# Patient Record
Sex: FEMALE | Race: BLACK OR AFRICAN AMERICAN | URBAN - METROPOLITAN AREA
[De-identification: names, ages, dates, MRNs, and addresses within clinical notes are randomized per-mention and may not be internally consistent; named-entity substitution may affect disease eponyms.]

---

## 2018-01-29 ENCOUNTER — OFFICE VISIT (OUTPATIENT)
Dept: INTERNAL MEDICINE CLINIC | Age: 63
End: 2018-01-29

## 2018-01-29 VITALS
HEART RATE: 74 BPM | WEIGHT: 157 LBS | TEMPERATURE: 98.3 F | OXYGEN SATURATION: 98 % | DIASTOLIC BLOOD PRESSURE: 78 MMHG | BODY MASS INDEX: 30.82 KG/M2 | RESPIRATION RATE: 18 BRPM | HEIGHT: 60 IN | SYSTOLIC BLOOD PRESSURE: 128 MMHG

## 2018-01-29 DIAGNOSIS — E11.8 CONTROLLED TYPE 2 DIABETES MELLITUS WITH COMPLICATION, WITHOUT LONG-TERM CURRENT USE OF INSULIN (HCC): Primary | ICD-10-CM

## 2018-01-29 DIAGNOSIS — R20.2 NUMBNESS AND TINGLING IN LEFT ARM: ICD-10-CM

## 2018-01-29 DIAGNOSIS — R42 DIZZINESS: ICD-10-CM

## 2018-01-29 DIAGNOSIS — R20.0 NUMBNESS AND TINGLING IN LEFT ARM: ICD-10-CM

## 2018-01-29 NOTE — PROGRESS NOTES
History:   Jovani Doss is a 65 y.o. female presenting today for an initial visit for Establish Care; Diabetes; Dizziness; and Tingling (left arm )  1. DM:  She has been on long acting insulin before every day. Hasn't been on that for 3 years. She was on it for about 1 year. Never on pills. She almost went into diabetic coma and was hospitalized. That's when they put her on the insulin. Then after 1 year she stopped the insulin. She stopped her diet and eating habits. Lost a lot of weight at that time. Then checked her sugars and were fine so stopped taking the insulin. Sugars still fine. So didn't restart the insulin. 2.  Dizziness:  Her equilibrium is off. When she gets up she feels dizzy. No dizziness in bed when moves head. Has polyuria and polydipsia. So thinks maybe sugar high. 3.  Tingling in left arm:  Calls it a vibration sensation. Review of Systems   Constitutional: Negative. Respiratory: Negative. Negative for shortness of breath. Cardiovascular: Negative. Negative for chest pain. Neurological: Positive for dizziness. Endo/Heme/Allergies: Positive for polydipsia. Past Medical History:   Diagnosis Date    Diabetes [facility])        History reviewed. No pertinent surgical history. Social History     Social History    Marital status:      Spouse name: N/A    Number of children: N/A    Years of education: N/A     Occupational History    Not on file.      Social History Main Topics    Smoking status: Current Every Day Smoker     Packs/day: 0.25     Years: 2.00    Smokeless tobacco: Never Used      Comment: maybe 1 to 2 cig a day     Alcohol use No    Drug use: No    Sexual activity: Yes     Partners: Male     Other Topics Concern    Not on file     Social History Narrative    No narrative on file       Family History   Problem Relation Age of Onset    Stroke Mother     No Known Problems Father        No current outpatient prescriptions on file prior to visit. No current facility-administered medications on file prior to visit. No Known Allergies    Objective:   VS:    Visit Vitals    /78 (BP 1 Location: Left arm, BP Patient Position: Sitting)    Pulse 74    Temp 98.3 °F (36.8 °C) (Oral)    Resp 18    Ht 5' (1.524 m)    Wt 157 lb (71.2 kg)    SpO2 98%    BMI 30.66 kg/m2     Physical Exam   Constitutional: She is oriented to person, place, and time. She appears well-developed and well-nourished. No distress. HENT:   Head: Normocephalic and atraumatic. Right Ear: External ear normal.   Left Ear: External ear normal.   Nose: Nose normal.   Mouth/Throat: Oropharynx is clear and moist.   Eyes: EOM are normal. Pupils are equal, round, and reactive to light. Neck: Normal range of motion. Neck supple. Carotid bruit is not present. No tracheal deviation present. Cardiovascular: Normal rate, regular rhythm, normal heart sounds and intact distal pulses. Exam reveals no gallop and no friction rub. No murmur heard. Pulmonary/Chest: Effort normal and breath sounds normal. She has no wheezes. She has no rales. Abdominal: Soft. Bowel sounds are normal. She exhibits no distension. There is no tenderness. Musculoskeletal: She exhibits no edema or tenderness. Lymphadenopathy:     She has no cervical adenopathy. Neurological: She is alert and oriented to person, place, and time. Skin: Skin is warm and dry. Psychiatric: She has a normal mood and affect. Her behavior is normal.   Nursing note and vitals reviewed. A1c:  6.9  FSBS:  254    Assessment/ Plan:     Diagnoses and all orders for this visit:    1. Controlled type 2 diabetes mellitus with complication, without long-term current use of insulin ([address]):  Has been diet controlled the last few years, but having symptoms now. Even though A1c less than 7, her post-prandial sugars are in 250s, so could use some medication.   Was too late in the day to get labs so she will return for fasting blood work in the next few days, and then will restart medication. 2. Numbness and tingling in left arm:  EKG showed non-specific T changes, but otherwise ok with no q waves. -     AMB POC HEMOGLOBIN A1C  -     AMB POC EKG ROUTINE W/ 12 LEADS, INTER & REP    3. Dizziness:  Probably caused by her diabetes, but will get full blood work to make sure. I have discussed the diagnosis with the patient and the intended plan as seen in the above orders. The patient verbalized understanding and agrees with the plan. Follow-up Disposition:  Return in about 3 days (around 2/1/2018) for diabetes clinic, fasting labs.     Laura Sandoval MD

## 2018-01-29 NOTE — MR AVS SNAPSHOT
[facility] [address] [id number] 
[phone] Patient: Jovani Doss MRN: [id number] [facility]:16[birth year]90[birth year] Visit Information Date & Time Provider Department Dept. Phone Encounter #  
 1/29/2018  5:30 PM Shawna Valdez MD [facility] [phone] Follow-up Instructions Return in about 3 days (around 2/1/2018) for diabetes clinic, fasting labs. Upcoming Health Maintenance Date Due Hepatitis C Screening 1955 DTaP/Tdap/Td series (1 - Tdap) 10/30/1977 PAP AKA CERVICAL CYTOLOGY 10/30/1977 BREAST CANCER SCRN MAMMOGRAM 10/30/2006 FOBT Q 1 YEAR AGE 50-75 10/30/2006 ZOSTER VACCINE AGE 60> 8/30/2016 Influenza Age 5 to Adult 8/1/2017 Allergies as of 1/29/2018  Review Complete On: 1/29/2018 By: Isabel Bradshaw No Known Allergies Current Immunizations  Never Reviewed No immunizations on file. Not reviewed this visit You Were Diagnosed With   
  
 Codes Comments Controlled type 2 diabetes mellitus with complication, without long-term current use of insulin ([facility]    -  Primary ICD-10-CM: E11.8 ICD-9-CM: 250.90 Numbness and tingling in left arm     ICD-10-CM: R20.0, R20.2 ICD-9-CM: 782.0 Dizziness     ICD-10-CM: X36 ICD-9-CM: 780.4 Vitals BP Pulse Temp Resp Height(growth percentile) Weight(growth percentile) 128/78 (BP 1 Location: Left arm, BP Patient Position: Sitting) 74 98.3 °F (36.8 °C) (Oral) 18 5' (1.524 m) 157 lb (71.2 kg) SpO2 BMI OB Status Smoking Status 98% 30.66 kg/m2 Postmenopausal Current Every Day Smoker Vitals History BMI and BSA Data Body Mass Index Body Surface Area  
 30.66 kg/m 2 1.74 m 2 Preferred Pharmacy Pharmacy Name Phone [facility] [address] [phone] Your Updated Medication List  
  
 Notice  As of 1/29/2018  6:18 PM  
 You have not been prescribed any medications. We Performed the Following AMB POC EKG ROUTINE W/ 12 LEADS, INTER & REP [60476 CPT(R)] AMB POC HEMOGLOBIN A1C [26574 CPT(R)] Follow-up Instructions Return in about 3 days (around 2/1/2018) for diabetes clinic, fasting labs. Patient Instructions Learning About Diabetes Food Guidelines Your Care Instructions Meal planning is important to manage diabetes. It helps keep your blood sugar at a target level (which you set with your doctor). You don't have to eat special foods. You can eat what your family eats, including sweets once in a while. But you do have to pay attention to how often you eat and how much you eat of certain foods. You may want to work with a dietitian or a certified diabetes educator (CDE) to help you plan meals and snacks. A dietitian or CDE can also help you lose weight if that is one of your goals. What should you know about eating carbs? Managing the amount of carbohydrate (carbs) you eat is an important part of healthy meals when you have diabetes. Carbohydrate is found in many foods. · Learn which foods have carbs. And learn the amounts of carbs in different foods. ¨ Bread, cereal, pasta, and rice have about 15 grams of carbs in a serving. A serving is 1 slice of bread (1 ounce), ½ cup of cooked cereal, or 1/3 cup of cooked pasta or rice. ¨ Fruits have 15 grams of carbs in a serving. A serving is 1 small fresh fruit, such as an apple or orange; ½ of a banana; ½ cup of cooked or canned fruit; ½ cup of fruit juice; 1 cup of melon or raspberries; or 2 tablespoons of dried fruit. ¨ Milk and no-sugar-added yogurt have 15 grams of carbs in a serving. A serving is 1 cup of milk or 2/3 cup of no-sugar-added yogurt. ¨ Starchy vegetables have 15 grams of carbs in a serving.  A serving is ½ cup of mashed potatoes or sweet potato; 1 cup winter squash; ½ of a small baked potato; ½ cup of cooked beans; or ½ cup cooked corn or green peas. · Learn how much carbs to eat each day and at each meal. A dietitian or CDE can teach you how to keep track of the amount of carbs you eat. This is called carbohydrate counting. · If you are not sure how to count carbohydrate grams, use the Plate Method to plan meals. It is a good, quick way to make sure that you have a balanced meal. It also helps you spread carbs throughout the day. ¨ Divide your plate by types of foods. Put non-starchy vegetables on half the plate, meat or other protein food on one-quarter of the plate, and a grain or starchy vegetable in the final quarter of the plate. To this you can add a small piece of fruit and 1 cup of milk or yogurt, depending on how many carbs you are supposed to eat at a meal. 
· Try to eat about the same amount of carbs at each meal. Do not \"save up\" your daily allowance of carbs to eat at one meal. 
· Proteins have very little or no carbs per serving. Examples of proteins are beef, chicken, turkey, fish, eggs, tofu, cheese, cottage cheese, and peanut butter. A serving size of meat is 3 ounces, which is about the size of a deck of cards. Examples of meat substitute serving sizes (equal to 1 ounce of meat) are 1/4 cup of cottage cheese, 1 egg, 1 tablespoon of peanut butter, and ½ cup of tofu. How can you eat out and still eat healthy? · Learn to estimate the serving sizes of foods that have carbohydrate. If you measure food at home, it will be easier to estimate the amount in a serving of restaurant food. · If the meal you order has too much carbohydrate (such as potatoes, corn, or baked beans), ask to have a low-carbohydrate food instead. Ask for a salad or green vegetables. · If you use insulin, check your blood sugar before and after eating out to help you plan how much to eat in the future.  
· If you eat more carbohydrate at a meal than you had planned, take a walk or do other exercise. This will help lower your blood sugar. What else should you know? · Limit saturated fat, such as the fat from meat and dairy products. This is a healthy choice because people who have diabetes are at higher risk of heart disease. So choose lean cuts of meat and nonfat or low-fat dairy products. Use olive or canola oil instead of butter or shortening when cooking. · Don't skip meals. Your blood sugar may drop too low if you skip meals and take insulin or certain medicines for diabetes. · Check with your doctor before you drink alcohol. Alcohol can cause your blood sugar to drop too low. Alcohol can also cause a bad reaction if you take certain diabetes medicines. Follow-up care is a key part of your treatment and safety. Be sure to make and go to all appointments, and call your doctor if you are having problems. It's also a good idea to know your test results and keep a list of the medicines you take. Where can you learn more? Go to http://jing-sunil.info/. Enter Y010 in the search box to learn more about \"Learning About Diabetes Food Guidelines. \" Current as of: March 13, 2017 Content Version: 11.4 © 1668-7446 [employer]. Care instructions adapted under license by [employer] (which disclaims liability or warranty for this information). If you have questions about a medical condition or this instruction, always ask your healthcare professional. [address] any warranty or liability for your use of this information. Introducing [facility]! [facility] introduces [employer] System patient portal. Now you can access parts of your medical record, email your doctor's office, and request medication refills online. 1. In your internet browser, go to [email or web address] 2. Click on the First Time User? Click Here link in the Sign In box. You will see the New Member Sign Up page. 3. Enter your [facility] Access Code exactly as it appears below. You will not need to use this code after youve completed the sign-up process. If you do not sign up before the expiration date, you must request a new code. · [facility] Access Code: [email or web address] Expires: 4/29/2018  6:18 PM 
 
4. Enter the last four digits of your Social Security Number (xxxx) and Date of Birth (mm/dd/yyyy) as indicated and click Submit. You will be taken to the next sign-up page. 5. Create a [employer] ID. This will be your [facility] login ID and cannot be changed, so think of one that is secure and easy to remember. 6. Create a [facility] password. You can change your password at any time. 7. Enter your Password Reset Question and Answer. This can be used at a later time if you forget your password. 8. Enter your e-mail address. You will receive e-mail notification when new information is available in [address] 9. Click Sign Up. You can now view and download portions of your medical record. 10. Click the Download Summary menu link to download a portable copy of your medical information. If you have questions, please visit the Frequently Asked Questions section of the [facility] website. Remember, [facility] is NOT to be used for urgent needs. For medical emergencies, dial 911. Now available from your iPhone and Android! Please provide this summary of care documentation to your next provider. If you have any questions after today's visit, please call [phone].

## 2018-01-29 NOTE — PROGRESS NOTES
Chief Complaint   Patient presents with    Establish Care    Diabetes    Dizziness     1. Have you been to the ER, urgent care clinic since your last visit? Hospitalized since your last visit? No    2. Have you seen or consulted any other health care providers outside of the [address] [place] [address] since your last visit? Include any pap smears or colon screening.  No

## 2018-01-29 NOTE — PATIENT INSTRUCTIONS

## 2018-01-30 LAB
GLUCOSE POC: 252 MG/DL
HBA1C MFR BLD HPLC: 6.9 %

## 2018-01-30 RX ORDER — INSULIN PUMP SYRINGE, 3 ML
EACH MISCELLANEOUS
Qty: 1 KIT | Refills: 0 | Status: SHIPPED | OUTPATIENT
Start: 2018-01-30

## 2018-01-30 RX ORDER — LANCETS
EACH MISCELLANEOUS
Qty: 100 EACH | Refills: 11 | Status: SHIPPED | OUTPATIENT
Start: 2018-01-30

## 2018-02-01 ENCOUNTER — OFFICE VISIT (OUTPATIENT)
Dept: INTERNAL MEDICINE CLINIC | Age: 63
End: 2018-02-01

## 2018-02-01 VITALS
DIASTOLIC BLOOD PRESSURE: 73 MMHG | BODY MASS INDEX: 30.82 KG/M2 | TEMPERATURE: 98.4 F | RESPIRATION RATE: 18 BRPM | WEIGHT: 157 LBS | HEART RATE: 72 BPM | OXYGEN SATURATION: 97 % | SYSTOLIC BLOOD PRESSURE: 121 MMHG | HEIGHT: 60 IN

## 2018-02-01 DIAGNOSIS — M79.605 BILATERAL LEG PAIN: ICD-10-CM

## 2018-02-01 DIAGNOSIS — I73.9 CLAUDICATION (HCC): ICD-10-CM

## 2018-02-01 DIAGNOSIS — E11.8 TYPE 2 DIABETES MELLITUS WITH COMPLICATION, WITHOUT LONG-TERM CURRENT USE OF INSULIN (HCC): Primary | ICD-10-CM

## 2018-02-01 DIAGNOSIS — M79.604 BILATERAL LEG PAIN: ICD-10-CM

## 2018-02-01 DIAGNOSIS — R20.0 NUMBNESS AND TINGLING IN LEFT ARM: ICD-10-CM

## 2018-02-01 DIAGNOSIS — Z11.59 NEED FOR HEPATITIS C SCREENING TEST: ICD-10-CM

## 2018-02-01 DIAGNOSIS — R20.2 NUMBNESS AND TINGLING IN LEFT ARM: ICD-10-CM

## 2018-02-01 LAB
BILIRUB UR QL STRIP: NORMAL
CREAT SERPL-MCNC: 300 MG/DL
GLUCOSE UR-MCNC: NEGATIVE MG/DL
KETONES P FAST UR STRIP-MCNC: NORMAL MG/DL
MICROALBUMIN UR TEST STR-MCNC: 30 MG/L
MICROALBUMIN/CREAT RATIO POC: <30 MG/G
PH UR STRIP: 5.5 [PH] (ref 4.6–8)
PROT UR QL STRIP: NEGATIVE
SP GR UR STRIP: 1.02 (ref 1–1.03)
UA UROBILINOGEN AMB POC: NORMAL (ref 0.2–1)
URINALYSIS CLARITY POC: CLEAR
URINALYSIS COLOR POC: YELLOW
URINE BLOOD POC: NEGATIVE
URINE LEUKOCYTES POC: NEGATIVE
URINE NITRITES POC: NEGATIVE

## 2018-02-01 NOTE — PROGRESS NOTES
Subjective:   Patient is a 65y.o. year old female who presents for Blood sugar problem (follow up)  1. DM:  She is fasting this morning. Still having some dizziness, tingling in the left arm. 2.  Tingling left arm:  Comes and goes. Most of the day. Not exertional.  It can come with just sitting there. When it comes, lasts for 3-4 minutes. Feels like total vibration in the left arm, from the tips of fingers all the way up the arm to shoulder. Feels like when your arm goes asleep. Goes away by itself. Has been going on for 1-2 months. Not getting worse. No CP, SOB, N/V, diaphoresis. No hx of neck problems. No weekness in the hands. 3.  Leg pain:  From hips down to bottom of legs in both legs. She can't walk any distance. Pain gets worse with walking. Pain goes away with sitting down after a while. Can only walk about a few minutes before has to stop. Denies coldness in toes or blueness. No hx of back problems. Pain is not in the joints. Review of Systems   Constitutional: Negative. Cardiovascular: Negative. Current Outpatient Prescriptions on File Prior to Visit   Medication Sig Dispense Refill    Blood-Glucose Meter monitoring kit Use to check blood sugars qd 1 Kit 0    glucose blood VI test strips (BLOOD GLUCOSE TEST) strip Use to check sugars qd 100 Strip 3    Lancets misc Use to check sugars qd 100 Each 11     No current facility-administered medications on file prior to visit. Reviewed PmHx, RxHx, FmHx, SocHx, AllgHx and updated and dated in the chart. Nurse notes were reviewed and are correct    Objective:     Vitals:    02/01/18 0834   BP: 121/73   Pulse: 72   Resp: 18   Temp: 98.4 °F (36.9 °C)   TempSrc: Oral   SpO2: 97%   Weight: 157 lb (71.2 kg)   Height: 5' (1.524 m)     Physical Exam   Constitutional: She is oriented to person, place, and time. She appears well-developed and well-nourished. No distress. HENT:   Head: Normocephalic and atraumatic. Right Ear: External ear normal.   Left Ear: External ear normal.   Nose: Nose normal.   Mouth/Throat: Oropharynx is clear and moist.   Eyes: EOM are normal. Pupils are equal, round, and reactive to light. Neck: Normal range of motion. Neck supple. Carotid bruit is not present. No tracheal deviation present. No cervical tenderness   Cardiovascular: Normal rate, regular rhythm, normal heart sounds and intact distal pulses. Exam reveals no gallop and no friction rub. No murmur heard. Pulmonary/Chest: Effort normal and breath sounds normal. She has no wheezes. She has no rales. Abdominal: Soft. Bowel sounds are normal. She exhibits no distension. There is no tenderness. Musculoskeletal: She exhibits no edema or tenderness. Back:  No tenderness  Negative straight leg raise bilaterally  Normal sensation, strength bilateral legs/feet. Decreased patellar reflexes bilaterally. Lymphadenopathy:     She has no cervical adenopathy. Neurological: She is alert and oriented to person, place, and time. Normal reflexes, sensation, strength in both arms. Neg Phalan's and Tinel's tests on left   Skin: Skin is warm and dry. Left hand:  Small 2 mm bump on palm of hand. Is in a crease that is already hyperpigmented, so has some pigmentation, but doesn't look like skin CA. Psychiatric: She has a normal mood and affect. Her behavior is normal.   Nursing note and vitals reviewed. Microalb/creat:  <30  U/A:  Neg except 1+ bili and trace ketones    Diabetic foot exam:     Left: Reflexes 2+     Filament test 6/6   Pulse DP: 2+ (normal)   Pulse PT: 2+ (normal)   Deformities: None    Right: Reflexes 2+   Filament test 6/6   Pulse DP: 2+ (normal)   Pulse PT: 2+ (normal)   Deformities: None    Assessment/ Plan:     Diagnoses and all orders for this visit:    1. Type 2 diabetes mellitus with complication, without long-term current use of insulin ([address]):  No neuropathy or nephropathy. Waiting for blood results. She doesn't want to be on medication and A1c was 6.9 so consider this diet controlled and she will work hard on diet and exercise for this. -     AMB POC URINALYSIS DIP STICK AUTO W/O MICRO  -     AMB POC URINE, MICROALBUMIN, SEMIQUANTITATIVE  -     HM DIABETES FOOT EXAM    2. Need for hepatitis C screening test  -     HEPATITIS C AB; Future    3. Claudication [facility]): With good pulses in feet, it's possible this is neurogenic, so I got lumbar x-rays and waiting on results. May need MRI  -     XR SPINE LUMB 2 OR 3 V; Future    4. Bilateral leg pain  -     XR SPINE LUMB 2 OR 3 V; Future    5. Numbness and tingling in left arm:  Seems to be pinched nerve. Got x-ray of neck. Could also be more peripheral such as carpal tunnel. Waiting on x-ray results. -     XR SPINE CERV PA LAT ODONT 3 V MAX; Future       I have discussed the diagnosis with the patient and the intended plan as seen in the above orders. The patient verbalized understanding and agrees with the plan. Follow-up Disposition:  Return in about 3 months (around 5/1/2018) for diabetes, dizziness.     Kayla Abad MD

## 2018-02-01 NOTE — PROGRESS NOTES
Chief Complaint   Patient presents with    Blood sugar problem     follow up     1. Have you been to the ER, urgent care clinic since your last visit? Hospitalized since your last visit? No    2. Have you seen or consulted any other health care providers outside of the [address] [place] [address] since your last visit? Include any pap smears or colon screening.  No

## 2018-02-02 LAB
ALBUMIN SERPL-MCNC: 4.3 G/DL (ref 3.6–4.8)
ALBUMIN/GLOB SERPL: 1.2 {RATIO} (ref 1.2–2.2)
ALP SERPL-CCNC: 84 IU/L (ref 39–117)
ALT SERPL-CCNC: 21 IU/L (ref 0–32)
AST SERPL-CCNC: 15 IU/L (ref 0–40)
BASOPHILS # BLD AUTO: 0 X10E3/UL (ref 0–0.2)
BASOPHILS NFR BLD AUTO: 1 %
BILIRUB SERPL-MCNC: <0.2 MG/DL (ref 0–1.2)
BUN SERPL-MCNC: 13 MG/DL (ref 8–27)
BUN/CREAT SERPL: 14 (ref 12–28)
CALCIUM SERPL-MCNC: 9.6 MG/DL (ref 8.7–10.3)
CHLORIDE SERPL-SCNC: 97 MMOL/L (ref 96–106)
CHOLEST SERPL-MCNC: 234 MG/DL (ref 100–199)
CO2 SERPL-SCNC: 26 MMOL/L (ref 18–29)
CREAT SERPL-MCNC: 0.9 MG/DL (ref 0.57–1)
EOSINOPHIL # BLD AUTO: 0.1 X10E3/UL (ref 0–0.4)
EOSINOPHIL NFR BLD AUTO: 1 %
ERYTHROCYTE [DISTWIDTH] IN BLOOD BY AUTOMATED COUNT: 14.7 % (ref 12.3–15.4)
GFR SERPLBLD CREATININE-BSD FMLA CKD-EPI: 69 ML/MIN/1.73
GFR SERPLBLD CREATININE-BSD FMLA CKD-EPI: 80 ML/MIN/1.73
GLOBULIN SER CALC-MCNC: 3.5 G/DL (ref 1.5–4.5)
GLUCOSE SERPL-MCNC: 111 MG/DL (ref 65–99)
HCT VFR BLD AUTO: 36.9 % (ref 34–46.6)
HCV AB S/CO SERPL IA: <0.1 S/CO RATIO (ref 0–0.9)
HDLC SERPL-MCNC: 47 MG/DL
HGB BLD-MCNC: 12 G/DL (ref 11.1–15.9)
IMM GRANULOCYTES # BLD: 0 X10E3/UL (ref 0–0.1)
IMM GRANULOCYTES NFR BLD: 0 %
LDLC SERPL CALC-MCNC: 152 MG/DL (ref 0–99)
LYMPHOCYTES # BLD AUTO: 2.7 X10E3/UL (ref 0.7–3.1)
LYMPHOCYTES NFR BLD AUTO: 43 %
MCH RBC QN AUTO: 30 PG (ref 26.6–33)
MCHC RBC AUTO-ENTMCNC: 32.5 G/DL (ref 31.5–35.7)
MCV RBC AUTO: 92 FL (ref 79–97)
MONOCYTES # BLD AUTO: 0.4 X10E3/UL (ref 0.1–0.9)
MONOCYTES NFR BLD AUTO: 6 %
NEUTROPHILS # BLD AUTO: 3 X10E3/UL (ref 1.4–7)
NEUTROPHILS NFR BLD AUTO: 49 %
PLATELET # BLD AUTO: 290 X10E3/UL (ref 150–379)
POTASSIUM SERPL-SCNC: 4.3 MMOL/L (ref 3.5–5.2)
PROT SERPL-MCNC: 7.8 G/DL (ref 6–8.5)
RBC # BLD AUTO: 4 X10E6/UL (ref 3.77–5.28)
SODIUM SERPL-SCNC: 141 MMOL/L (ref 134–144)
TRIGL SERPL-MCNC: 173 MG/DL (ref 0–149)
TSH SERPL DL<=0.005 MIU/L-ACNC: 4 UIU/ML (ref 0.45–4.5)
VLDLC SERPL CALC-MCNC: 35 MG/DL (ref 5–40)
WBC # BLD AUTO: 6.1 X10E3/UL (ref 3.4–10.8)

## 2018-02-05 NOTE — PROGRESS NOTES
Please let her know her cholesterol was 234 and . These are too high, especially for diabetic. I recommend her be on a statin for cholesterol. See if she's willing for me to call this in.

## 2018-02-08 DIAGNOSIS — R20.2 NUMBNESS AND TINGLING IN LEFT ARM: Primary | ICD-10-CM

## 2018-02-08 DIAGNOSIS — R20.0 NUMBNESS AND TINGLING IN LEFT ARM: Primary | ICD-10-CM

## 2018-02-08 DIAGNOSIS — E78.00 PURE HYPERCHOLESTEROLEMIA: Primary | ICD-10-CM

## 2018-02-08 DIAGNOSIS — M48.02 CERVICAL STENOSIS OF SPINAL CANAL: ICD-10-CM

## 2018-02-08 DIAGNOSIS — I73.9 CLAUDICATION (HCC): ICD-10-CM

## 2018-02-08 RX ORDER — PRAVASTATIN SODIUM 40 MG/1
40 TABLET ORAL
Qty: 30 TAB | Refills: 5 | Status: SHIPPED | OUTPATIENT
Start: 2018-02-08

## 2018-02-13 DIAGNOSIS — M47.816 SPONDYLOSIS OF LUMBAR SPINE: ICD-10-CM

## 2018-02-13 DIAGNOSIS — M50.30 DDD (DEGENERATIVE DISC DISEASE), CERVICAL: ICD-10-CM

## 2018-02-13 DIAGNOSIS — R20.0 NUMBNESS AND TINGLING IN LEFT ARM: ICD-10-CM

## 2018-02-13 DIAGNOSIS — M48.02 SPINAL STENOSIS OF CERVICAL REGION: ICD-10-CM

## 2018-02-13 DIAGNOSIS — M51.36 DDD (DEGENERATIVE DISC DISEASE), LUMBAR: ICD-10-CM

## 2018-02-13 DIAGNOSIS — G95.19 NEUROGENIC CLAUDICATION (HCC): Primary | ICD-10-CM

## 2018-02-13 DIAGNOSIS — R20.2 NUMBNESS AND TINGLING IN LEFT ARM: ICD-10-CM

## 2018-02-13 DIAGNOSIS — G95.19 NEUROGENIC CLAUDICATION (HCC): ICD-10-CM

## 2018-02-23 ENCOUNTER — TELEPHONE (OUTPATIENT)
Dept: INTERNAL MEDICINE CLINIC | Age: 63
End: 2018-02-23

## 2018-02-23 NOTE — TELEPHONE ENCOUNTER
I got letter from insurance saying they don't want to cover the MRIs so I called the patient. She has appt to see neurosurgery next week. She said the leg symptoms are better but the arm numbness is the same.   She will keep the neurosurgery appt and go from there

## 2018-02-26 ENCOUNTER — TELEPHONE (OUTPATIENT)
Dept: INTERNAL MEDICINE CLINIC | Age: 63
End: 2018-02-26

## 2018-02-26 NOTE — TELEPHONE ENCOUNTER
Lizette Prabhakar from [facility] would like a call back form Dr Weston Sigala nurse.

## 2018-02-27 NOTE — TELEPHONE ENCOUNTER
Need a phone # for the H/H nurse. Teressa Guidry has several groups and the nurses have their own #'s when out with pt's.